# Patient Record
Sex: MALE | Race: WHITE | URBAN - METROPOLITAN AREA
[De-identification: names, ages, dates, MRNs, and addresses within clinical notes are randomized per-mention and may not be internally consistent; named-entity substitution may affect disease eponyms.]

---

## 2018-04-26 ENCOUNTER — NURSE ONLY (OUTPATIENT)
Dept: FAMILY MEDICINE CLINIC | Facility: CLINIC | Age: 58
End: 2018-04-26

## 2018-04-26 DIAGNOSIS — Z51.89 WOUND CHECK, ABSCESS: Primary | ICD-10-CM

## 2018-04-27 NOTE — PROGRESS NOTES
Patient seen yesterday by PCP had sebaceous cyst drained midback and placed on oral anbx. Here today for wound change due to location he can not change it himself. He is afebrile without concerns of systemic symptoms.     EXAM: healing mildly indurated 2cm